# Patient Record
Sex: FEMALE | Race: WHITE | NOT HISPANIC OR LATINO | Employment: OTHER | URBAN - METROPOLITAN AREA
[De-identification: names, ages, dates, MRNs, and addresses within clinical notes are randomized per-mention and may not be internally consistent; named-entity substitution may affect disease eponyms.]

---

## 2021-03-02 ENCOUNTER — HOSPITAL ENCOUNTER (EMERGENCY)
Facility: MEDICAL CENTER | Age: 86
End: 2021-03-02
Attending: EMERGENCY MEDICINE
Payer: MEDICARE

## 2021-03-02 ENCOUNTER — APPOINTMENT (OUTPATIENT)
Dept: RADIOLOGY | Facility: MEDICAL CENTER | Age: 86
End: 2021-03-02
Attending: EMERGENCY MEDICINE
Payer: MEDICARE

## 2021-03-02 VITALS
DIASTOLIC BLOOD PRESSURE: 67 MMHG | SYSTOLIC BLOOD PRESSURE: 150 MMHG | TEMPERATURE: 97.4 F | RESPIRATION RATE: 18 BRPM | HEIGHT: 61 IN | OXYGEN SATURATION: 91 % | WEIGHT: 145 LBS | BODY MASS INDEX: 27.38 KG/M2 | HEART RATE: 70 BPM

## 2021-03-02 DIAGNOSIS — R10.84 GENERALIZED ABDOMINAL PAIN: ICD-10-CM

## 2021-03-02 DIAGNOSIS — D64.9 ANEMIA, UNSPECIFIED TYPE: ICD-10-CM

## 2021-03-02 LAB
ALBUMIN SERPL BCP-MCNC: 3.6 G/DL (ref 3.2–4.9)
ALBUMIN/GLOB SERPL: 1.2 G/DL
ALP SERPL-CCNC: 68 U/L (ref 30–99)
ALT SERPL-CCNC: 14 U/L (ref 2–50)
ANION GAP SERPL CALC-SCNC: 10 MMOL/L (ref 7–16)
ANISOCYTOSIS BLD QL SMEAR: ABNORMAL
APPEARANCE UR: CLEAR
AST SERPL-CCNC: 18 U/L (ref 12–45)
BASOPHILS # BLD AUTO: 1.2 % (ref 0–1.8)
BASOPHILS # BLD: 0.07 K/UL (ref 0–0.12)
BILIRUB SERPL-MCNC: 0.5 MG/DL (ref 0.1–1.5)
BILIRUB UR QL STRIP.AUTO: NEGATIVE
BUN SERPL-MCNC: 16 MG/DL (ref 8–22)
CALCIUM SERPL-MCNC: 8.7 MG/DL (ref 8.5–10.5)
CHLORIDE SERPL-SCNC: 101 MMOL/L (ref 96–112)
CO2 SERPL-SCNC: 21 MMOL/L (ref 20–33)
COLOR UR: YELLOW
COMMENT 1642: NORMAL
CREAT SERPL-MCNC: 0.64 MG/DL (ref 0.5–1.4)
EOSINOPHIL # BLD AUTO: 0.46 K/UL (ref 0–0.51)
EOSINOPHIL NFR BLD: 7.7 % (ref 0–6.9)
ERYTHROCYTE [DISTWIDTH] IN BLOOD BY AUTOMATED COUNT: 47.3 FL (ref 35.9–50)
GLOBULIN SER CALC-MCNC: 2.9 G/DL (ref 1.9–3.5)
GLUCOSE SERPL-MCNC: 92 MG/DL (ref 65–99)
GLUCOSE UR STRIP.AUTO-MCNC: NEGATIVE MG/DL
HCT VFR BLD AUTO: 32.9 % (ref 37–47)
HGB BLD-MCNC: 9.5 G/DL (ref 12–16)
HYPOCHROMIA BLD QL SMEAR: ABNORMAL
IMM GRANULOCYTES # BLD AUTO: 0.02 K/UL (ref 0–0.11)
IMM GRANULOCYTES NFR BLD AUTO: 0.3 % (ref 0–0.9)
KETONES UR STRIP.AUTO-MCNC: NEGATIVE MG/DL
LACTATE BLD-SCNC: 1.2 MMOL/L (ref 0.5–2)
LEUKOCYTE ESTERASE UR QL STRIP.AUTO: NEGATIVE
LIPASE SERPL-CCNC: 38 U/L (ref 11–82)
LYMPHOCYTES # BLD AUTO: 1.51 K/UL (ref 1–4.8)
LYMPHOCYTES NFR BLD: 25.2 % (ref 22–41)
MCH RBC QN AUTO: 22.2 PG (ref 27–33)
MCHC RBC AUTO-ENTMCNC: 28.9 G/DL (ref 33.6–35)
MCV RBC AUTO: 76.9 FL (ref 81.4–97.8)
MICRO URNS: NORMAL
MICROCYTES BLD QL SMEAR: ABNORMAL
MONOCYTES # BLD AUTO: 0.57 K/UL (ref 0–0.85)
MONOCYTES NFR BLD AUTO: 9.5 % (ref 0–13.4)
MORPHOLOGY BLD-IMP: NORMAL
NEUTROPHILS # BLD AUTO: 3.37 K/UL (ref 2–7.15)
NEUTROPHILS NFR BLD: 56.1 % (ref 44–72)
NITRITE UR QL STRIP.AUTO: NEGATIVE
NRBC # BLD AUTO: 0 K/UL
NRBC BLD-RTO: 0 /100 WBC
OVALOCYTES BLD QL SMEAR: NORMAL
PH UR STRIP.AUTO: 7.5 [PH] (ref 5–8)
PLATELET # BLD AUTO: 326 K/UL (ref 164–446)
PLATELET BLD QL SMEAR: NORMAL
PMV BLD AUTO: 10.5 FL (ref 9–12.9)
POIKILOCYTOSIS BLD QL SMEAR: NORMAL
POLYCHROMASIA BLD QL SMEAR: NORMAL
POTASSIUM SERPL-SCNC: 4.2 MMOL/L (ref 3.6–5.5)
PROT SERPL-MCNC: 6.5 G/DL (ref 6–8.2)
PROT UR QL STRIP: NEGATIVE MG/DL
RBC # BLD AUTO: 4.28 M/UL (ref 4.2–5.4)
RBC BLD AUTO: PRESENT
RBC UR QL AUTO: NEGATIVE
SODIUM SERPL-SCNC: 132 MMOL/L (ref 135–145)
SP GR UR STRIP.AUTO: 1.01
UROBILINOGEN UR STRIP.AUTO-MCNC: 0.2 MG/DL
WBC # BLD AUTO: 6 K/UL (ref 4.8–10.8)

## 2021-03-02 PROCEDURE — 83690 ASSAY OF LIPASE: CPT

## 2021-03-02 PROCEDURE — 85025 COMPLETE CBC W/AUTO DIFF WBC: CPT

## 2021-03-02 PROCEDURE — 80053 COMPREHEN METABOLIC PANEL: CPT

## 2021-03-02 PROCEDURE — A9270 NON-COVERED ITEM OR SERVICE: HCPCS | Performed by: EMERGENCY MEDICINE

## 2021-03-02 PROCEDURE — 700117 HCHG RX CONTRAST REV CODE 255: Performed by: EMERGENCY MEDICINE

## 2021-03-02 PROCEDURE — 81003 URINALYSIS AUTO W/O SCOPE: CPT

## 2021-03-02 PROCEDURE — 36415 COLL VENOUS BLD VENIPUNCTURE: CPT

## 2021-03-02 PROCEDURE — 83605 ASSAY OF LACTIC ACID: CPT

## 2021-03-02 PROCEDURE — 74177 CT ABD & PELVIS W/CONTRAST: CPT | Mod: ME

## 2021-03-02 PROCEDURE — 700102 HCHG RX REV CODE 250 W/ 637 OVERRIDE(OP): Performed by: EMERGENCY MEDICINE

## 2021-03-02 PROCEDURE — 99285 EMERGENCY DEPT VISIT HI MDM: CPT

## 2021-03-02 RX ORDER — ACETAMINOPHEN 325 MG/1
650 TABLET ORAL ONCE
Status: COMPLETED | OUTPATIENT
Start: 2021-03-02 | End: 2021-03-02

## 2021-03-02 RX ADMIN — ACETAMINOPHEN 650 MG: 325 TABLET, FILM COATED ORAL at 16:57

## 2021-03-02 RX ADMIN — IOHEXOL 100 ML: 350 INJECTION, SOLUTION INTRAVENOUS at 14:22

## 2021-03-02 NOTE — ED NOTES
Blood drawn and sent to lab. Pt ambulated up to bathroom and provided urine sample. Family to bedside and aware of POC. Call light within reach, will continue to monitor.

## 2021-03-02 NOTE — ED PROVIDER NOTES
ED Provider Note    CHIEF COMPLAINT  Chief Complaint   Patient presents with   • Knee Pain   • Groin Pain       HPI  Aurora Matthews is a 89 y.o. female who presents to the emergency department complaining of pain.  Per EMS report the patient had right lower quadrant abdominal pain right groin pain last night.  When she arrived she was complaining of right knee pain to the nurses here in the ED.  From my evaluation she states she had severe abdominal pain yesterday that is since resolved.  She does not remember where the pain was she does not remember the onset, the exact location and she does not remember if it radiated.  She does not recall when it went away.    Patient has any nausea vomiting or diarrhea.  She denies any urinary symptoms.  Her history is somewhat unreliable but she cannot recall the events.  She does have a history of dementia.    Past history is obtained from chart review.    REVIEW OF SYSTEMS  See HPI for further details. All other systems are negative.    PAST MEDICAL HISTORY  Past Medical History:   Diagnosis Date   • Allergy    • Angina    • CATARACT    • Constipation    • Diverticulosis of colon    • Fall    • Heart attack (HCC) 2006   • Hypertension        FAMILY HISTORY  Family History   Problem Relation Age of Onset   • Heart Disease Father         MI- 32 yo   • Heart Disease Brother         MI       SOCIAL HISTORY  Social History     Socioeconomic History   • Marital status: Single     Spouse name: Not on file   • Number of children: Not on file   • Years of education: Not on file   • Highest education level: Not on file   Occupational History   • Not on file   Tobacco Use   • Smoking status: Never Smoker   • Smokeless tobacco: Never Used   Substance and Sexual Activity   • Alcohol use: Yes     Comment: rare   • Drug use: No   • Sexual activity: Never   Other Topics Concern   • Not on file   Social History Narrative   • Not on file     Social Determinants of Health     Financial Resource  "Strain:    • Difficulty of Paying Living Expenses:    Food Insecurity:    • Worried About Running Out of Food in the Last Year:    • Ran Out of Food in the Last Year:    Transportation Needs:    • Lack of Transportation (Medical):    • Lack of Transportation (Non-Medical):    Physical Activity:    • Days of Exercise per Week:    • Minutes of Exercise per Session:    Stress:    • Feeling of Stress :    Social Connections:    • Frequency of Communication with Friends and Family:    • Frequency of Social Gatherings with Friends and Family:    • Attends Jew Services:    • Active Member of Clubs or Organizations:    • Attends Club or Organization Meetings:    • Marital Status:    Intimate Partner Violence:    • Fear of Current or Ex-Partner:    • Emotionally Abused:    • Physically Abused:    • Sexually Abused:        SURGICAL HISTORY  Past Surgical History:   Procedure Laterality Date   • APPENDECTOMY     • OTHER ABDOMINAL SURGERY      diverticulitis- had partial colectomy   • SINUSCOPY      surgery for sinuses.    • TONSILLECTOMY         CURRENT MEDICATIONS  Home Medications     Reviewed by Mouna Townsend R.N. (Registered Nurse) on 03/02/21 at 1109  Med List Status: Unable to Obtain   Medication Last Dose Status   naproxen (NAPROSYN) 500 MG TABS  Active                ALLERGIES  No Known Allergies    PHYSICAL EXAM  VITAL SIGNS: /69   Pulse 70   Temp 36.3 °C (97.4 °F) (Temporal)   Resp 18   Ht 1.549 m (5' 1\")   Wt 65.8 kg (145 lb)   SpO2 99%   BMI 27.40 kg/m²      Constitutional: Awake alert no acute distress.  HENT: Normocephalic, Atraumatic, Bilateral external ears normal,  Eyes: PERRL, EOMI, Conjunctiva normal, No discharge.   Neck: Normal range of motion  Cardiovascular: Normal heart rate, Normal rhythm, No murmurs, No rubs, No gallops.   Thorax & Lungs: Normal breath sounds, No respiratory distress, No wheezing,  Abdomen: Bowel sounds normal, Soft, No tenderness,  Skin: Warm, Dry, No erythema, " No rash.   Rectal: Performed with RN Mouna as a chaperone, brown guaiac negative stool    Musculoskeletal: Good range of motion in all major joints.  Neurologic: Alert, No focal deficits noted.   Psychiatric: Affect pleasant      Results for orders placed or performed during the hospital encounter of 03/02/21   CBC WITH DIFFERENTIAL   Result Value Ref Range    WBC 6.0 4.8 - 10.8 K/uL    RBC 4.28 4.20 - 5.40 M/uL    Hemoglobin 9.5 (L) 12.0 - 16.0 g/dL    Hematocrit 32.9 (L) 37.0 - 47.0 %    MCV 76.9 (L) 81.4 - 97.8 fL    MCH 22.2 (L) 27.0 - 33.0 pg    MCHC 28.9 (L) 33.6 - 35.0 g/dL    RDW 47.3 35.9 - 50.0 fL    Platelet Count 326 164 - 446 K/uL    MPV 10.5 9.0 - 12.9 fL    Neutrophils-Polys 56.10 44.00 - 72.00 %    Lymphocytes 25.20 22.00 - 41.00 %    Monocytes 9.50 0.00 - 13.40 %    Eosinophils 7.70 (H) 0.00 - 6.90 %    Basophils 1.20 0.00 - 1.80 %    Immature Granulocytes 0.30 0.00 - 0.90 %    Nucleated RBC 0.00 /100 WBC    Neutrophils (Absolute) 3.37 2.00 - 7.15 K/uL    Lymphs (Absolute) 1.51 1.00 - 4.80 K/uL    Monos (Absolute) 0.57 0.00 - 0.85 K/uL    Eos (Absolute) 0.46 0.00 - 0.51 K/uL    Baso (Absolute) 0.07 0.00 - 0.12 K/uL    Immature Granulocytes (abs) 0.02 0.00 - 0.11 K/uL    NRBC (Absolute) 0.00 K/uL    Hypochromia 1+     Anisocytosis 2+ (A)     Microcytosis 2+ (A)    COMP METABOLIC PANEL   Result Value Ref Range    Sodium 132 (L) 135 - 145 mmol/L    Potassium 4.2 3.6 - 5.5 mmol/L    Chloride 101 96 - 112 mmol/L    Co2 21 20 - 33 mmol/L    Anion Gap 10.0 7.0 - 16.0    Glucose 92 65 - 99 mg/dL    Bun 16 8 - 22 mg/dL    Creatinine 0.64 0.50 - 1.40 mg/dL    Calcium 8.7 8.5 - 10.5 mg/dL    AST(SGOT) 18 12 - 45 U/L    ALT(SGPT) 14 2 - 50 U/L    Alkaline Phosphatase 68 30 - 99 U/L    Total Bilirubin 0.5 0.1 - 1.5 mg/dL    Albumin 3.6 3.2 - 4.9 g/dL    Total Protein 6.5 6.0 - 8.2 g/dL    Globulin 2.9 1.9 - 3.5 g/dL    A-G Ratio 1.2 g/dL   LIPASE   Result Value Ref Range    Lipase 38 11 - 82 U/L   LACTIC ACID    Result Value Ref Range    Lactic Acid 1.2 0.5 - 2.0 mmol/L   URINALYSIS CULTURE, IF INDICATED    Specimen: Urine   Result Value Ref Range    Color Yellow     Character Clear     Specific Gravity 1.010 <1.035    Ph 7.5 5.0 - 8.0    Glucose Negative Negative mg/dL    Ketones Negative Negative mg/dL    Protein Negative Negative mg/dL    Bilirubin Negative Negative    Urobilinogen, Urine 0.2 Negative    Nitrite Negative Negative    Leukocyte Esterase Negative Negative    Occult Blood Negative Negative    Micro Urine Req see below    ESTIMATED GFR   Result Value Ref Range    GFR If African American >60 >60 mL/min/1.73 m 2    GFR If Non African American >60 >60 mL/min/1.73 m 2   PERIPHERAL SMEAR REVIEW   Result Value Ref Range    Peripheral Smear Review see below    PLATELET ESTIMATE   Result Value Ref Range    Plt Estimation Normal    MORPHOLOGY   Result Value Ref Range    RBC Morphology Present     Polychromia 1+     Poikilocytosis 1+     Ovalocytes 1+    DIFFERENTIAL COMMENT   Result Value Ref Range    Comments-Diff see below         RADIOLOGY/PROCEDURES  CT-ABDOMEN-PELVIS WITH   Final Result      1.  No acute inflammatory process in the abdomen or pelvis.   2.  Colonic diverticulosis. No diverticulitis.   3.  Small hiatal hernia.   4.  Nonobstructing bilateral nephrolithiasis.   5.  Uterine fibroids.          COURSE & MEDICAL DECISION MAKING  Pertinent Labs & Imaging studies reviewed. (See chart for details)    The patient presents to the emergency department for evaluation of abdominal pain.  The patient is 89 years old really unable to provide me much history.  She states she has had last night but her pain is since resolved.  Does not have any Ativan    Has not had a fever chills and urinary complaint.  No cough or shortness of breath.  She states she cannot tell me where the pain is right is no falls or trauma.  Denies kidney pain.  No urinary complaints.  Exam is benign.      At this point because of her age  and comorbidities elected to do a work-up, CBC CMP and lipase are unremarkable, lactic acid is normal.  Urinalysis does not show signs of infection or blood.    The patient CT scan does not show any pelvic injury, she has no acute abdominal inflammatory change on CT.  She has diverticulosis without diverticulitis and a history of nonobstructing nephrolithiasis.    Patient states she did have a lot of gas last night was given gas pain.  She could have passed a kidney stone.  At this point she has been here in the ED for several hours being observed and assessed and reassessed on several occasions.  She is remained pain-free her abdominal exams remain benign on 3 subsequent examinations.  I talked to her granddaughter who is at bedside she is comfortable her returning to a skilled nursing facility.    The patient will be given oral challenge and Tylenol be discharged back to her skilled nursing facility.    Patient is noted to be anemic, this is new but we do not have any recent labs to compare.  Her last labs in our system are from 2013.  She has a microcytic anemia is likely chronic.  She has a rectal exam that shows brown guaiac negative stool and her BUN is not relatively high related to her creatinine.  I do not think she has an upper GI bleed.  There is no evidence of acute lower GI bleed.    I have made her granddaughter aware of this finding and the fact that she needs follow-up.  She verbalized understanding and states she will comply.    They verbalized understand return precautions and follow-up plans.  The patient is discharged in good condition.    No follow-up provider specified.    FINAL IMPRESSION  1. Generalized abdominal pain     2. Anemia, unspecified type         3.         Electronically signed by: Duarte Rubi M.D., 3/2/2021 11:32 AM

## 2021-03-02 NOTE — ED TRIAGE NOTES
Chief Complaint   Patient presents with   • Knee Pain   • Groin Pain     Pt arrives via EMS from [facility]. Pt c/o right knee and groin pain starting yesterday. Denies any trauma or hx of same.   Pt ARZATE, sensation intact and BLE pale with +pulses.   Chart up for ERP.

## 2021-03-02 NOTE — ED NOTES
Assisted patient with bedpan.   Changed blankets for warm blankets, given patient pillow.  Patient requests water and to go home.  RN aware

## 2021-03-03 DIAGNOSIS — Z23 NEED FOR VACCINATION: ICD-10-CM

## 2021-03-03 NOTE — ED NOTES
RN spoke with grand daughter Sophie.  Grand daughter on way back to ER to take pt back to SNF upon DC.

## 2021-03-03 NOTE — DISCHARGE INSTRUCTIONS
Return to the emergency department if your abdominal pain returns or for any new symptoms or concerns.   Follow-up with your doctor.    You are anemic, you may need more work-up follow-up with your doctor.

## 2022-06-15 ENCOUNTER — HOSPITAL ENCOUNTER (EMERGENCY)
Facility: MEDICAL CENTER | Age: 87
End: 2022-06-15
Attending: EMERGENCY MEDICINE
Payer: OTHER GOVERNMENT

## 2022-06-15 ENCOUNTER — APPOINTMENT (OUTPATIENT)
Dept: RADIOLOGY | Facility: MEDICAL CENTER | Age: 87
End: 2022-06-15
Attending: EMERGENCY MEDICINE
Payer: OTHER GOVERNMENT

## 2022-06-15 VITALS
SYSTOLIC BLOOD PRESSURE: 177 MMHG | WEIGHT: 141 LBS | TEMPERATURE: 97.5 F | HEART RATE: 70 BPM | RESPIRATION RATE: 18 BRPM | DIASTOLIC BLOOD PRESSURE: 73 MMHG | BODY MASS INDEX: 25.95 KG/M2 | HEIGHT: 62 IN | OXYGEN SATURATION: 94 %

## 2022-06-15 DIAGNOSIS — S09.90XA CLOSED HEAD INJURY, INITIAL ENCOUNTER: ICD-10-CM

## 2022-06-15 DIAGNOSIS — S00.03XA SCALP HEMATOMA, INITIAL ENCOUNTER: ICD-10-CM

## 2022-06-15 DIAGNOSIS — S00.01XA ABRASION OF SCALP, INITIAL ENCOUNTER: ICD-10-CM

## 2022-06-15 PROCEDURE — 99284 EMERGENCY DEPT VISIT MOD MDM: CPT | Mod: 25

## 2022-06-15 PROCEDURE — 72125 CT NECK SPINE W/O DYE: CPT | Mod: MF

## 2022-06-15 PROCEDURE — 70450 CT HEAD/BRAIN W/O DYE: CPT | Mod: MG

## 2022-06-15 ASSESSMENT — FIBROSIS 4 INDEX: FIB4 SCORE: 1.46

## 2022-06-15 NOTE — ED PROVIDER NOTES
"ED Provider Note    CHIEF COMPLAINT      HPI  Aurora Matthews is a 89 y.o. female who presents as a TBI activation after ground-level fall at home tonight.  Apparently fell forward out of her wheelchair striking her head.  She does not remember the incident and states that her pain is minimal.  She has no neck pain, chest pain, back pain, or abdominal pain.  She has no pain to her arms or legs.      REVIEW OF SYSTEMS  Constitutional: No recent fevers or chills  Skin: Injury to scalp  HEENT: Injury to scalp.  No facial pain or mandible pain.  Neck: No neck pain or stiffness  Chest: No pain, abrasions, lacerations, or bruising  Pulm: No shortness of breath, pain with deep inspiration or expiration, or hemoptysis  Gastrointestinal: No abdominal pain, nausea, or distention.    Musculoskeletal: No pain, swelling, or focal weakness  Neurologic: Patient denies any numbness or tingling but does not remember the episode  Heme: No bleeding or bruising problems.   Immuno: No hx of recurrent infections      PAST MEDICAL HISTORY   has a past medical history of Allergy, Angina, CATARACT, Constipation, Diverticulosis of colon, Fall, Heart attack (HCC) (2006), and Hypertension.    SOCIAL HISTORY  Social History     Tobacco Use   • Smoking status: Never Smoker   • Smokeless tobacco: Never Used   Substance and Sexual Activity   • Alcohol use: Yes     Comment: rare   • Drug use: No   • Sexual activity: Never       SURGICAL HISTORY   has a past surgical history that includes other abdominal surgery; tonsillectomy; sinuscopy; and appendectomy.    CURRENT MEDICATIONS  Home Medications     Reviewed by Miranda Quezada R.N. (Registered Nurse) on 06/15/22 at 0152  Med List Status: Not Addressed   Medication Last Dose Status   naproxen (NAPROSYN) 500 MG TABS  Active                ALLERGIES  No Known Allergies    PHYSICAL EXAM  VITAL SIGNS: BP (!) 147/85   Pulse 78   Temp 36.4 °C (97.5 °F)   Resp 17   Ht 1.575 m (5' 2\")   Wt 64 kg (141 " lb)   SpO2 96%   BMI 25.79 kg/m²    Gen: Appears tired, otherwise no apparent distress  HEENT: Hematoma with apparent overlying abrasion.  Bleeding controlled however there is matted blood and hair around the area. bilateral external ears normal, Nose normal. Conjunctiva normal, Non-icteric.  Pupils equal bilaterally.  No facial tenderness, mandible tenderness or loose dentition.  Patient able to range mandible without distress.  Neck: Patient in c-collar on arrival.  After c-collar was removed, patient was examined.  No tenderness, Supple, No masses  Lymphatic: No cervical lymphadenopathy noted.   Cardiovascular: Regular rate and rhythm.  Capillary refill less than 3 seconds to all extremities, 2+ distal pulses to all extremities.  Thorax & Lungs: Normal breath sounds, No respiratory distress, No wheezing bilateral chest rise.  No tenderness to palpation or pain with AP/lateral compression of the chest wall.  No subcutaneous emphysema no crepitus, no step-offs, no deformities, no abrasions, no lacerations, no ecchymosis  Abdomen: Bowel sounds normal, Soft, No tenderness, No masses, No pulsatile masses. No Guarding or rebound  Skin: Warm, Dry.  No abrasions, lacerations, or ecchymosis noted  Back: No bony tenderness, No CVA tenderness.  No spinous process tenderness from base of occiput to sacrum.  No step-offs, no deformities, no ecchymosis, abrasions, or lacerations  Extremities: LUE: Passive range of motion of all joints from the shoulder to the fingers appear normal with no distress.  There are no tense muscle compartments, abrasions, ecchymosis, or lacerations noted  RUE: Passive range of motion of all joints from the shoulder to the fingers appear normal with no distress.  There are no tense muscle compartments, abrasions, ecchymosis, or lacerations   LLE:Passive range of motion of all joints from the hip to the toes appears normal with no distress.  There are no tense muscle compartments, abrasions,  ecchymosis, or lacerations noted  RLE:Passive range of motion of all joints from the hip to the toes appears normal with no distress.  There are no tense muscle compartments, abrasions, ecchymosis, or lacerations noted  Neurologic: Appears tired but otherwise alert.  Demonstrates orientation to person and place but disoriented to time and situation.  Moves all extremities and demonstrates 5 out of 5 muscle strength to upper and lower extremities although her range of motion is somewhat limited by her age.  Psychiatric: Affect pleasant.      RADIOLOGY  CT-HEAD W/O   Final Result      1. Cerebral atrophy.   2. White matter lucencies most consistent with small vessel ischemic change versus demyelination or gliosis.   3. No acute intracranial abnormality.   4. Right posterior scalp hematoma.   5. Mild interval growth of a left frontal calcified extra-axial lesion measuring 8 mm that may represent a meningioma.      CT-CSPINE WITHOUT PLUS RECONS   Final Result      1.  No acute traumatic injury of the cervical spine.   2.  Multilevel degenerative changes, most severe at C5-C6.   3.  Bilateral carotid bulb atherosclerotic changes.          COURSE & MEDICAL DECISION MAKING  Patient has for evaluation of what appears to be a low-energy ground-level fall although the exact details are unclear.  Patient's imaging was reassuring although there were a number of incidental findings as noted above.  None of these require emergent expert consultation or further inpatient evaluation.  Likewise, I did not feel laboratory evaluation would benefit the patient or .  Notable that the patient had her wound cleaned by nursing staff and there was no repairable laceration.  There did appear to be a small skin tear/superficial laceration which had stopped bleeding.  Patient will be discharged back to her care facility with local wound care.    FINAL IMPRESSION  1. Closed head injury, initial encounter    2. Scalp hematoma,  initial encounter    3. Abrasion of scalp, initial encounter        Electronically signed by: Gokul Butler M.D., 6/15/2022 1:52 AM

## 2022-06-15 NOTE — ED NOTES
Attempted to call pt's daughter and responsible party Sophie [phone], voicemail was left regarding pt status and need for ride back to [facility]. Social work will be contacted to arrange transportation.

## 2022-06-15 NOTE — ED TRIAGE NOTES
Chief Complaint   Patient presents with   • GLF     DANILO BROWNLEE from an assisted living facility. Per staff, they think pt was wheeling herself in her wheelchair when her flip flop got caught and she fell out of her chair. Pt appears to have a hematoma the right side of her head. Unknown LOC. No thinners. C/o neck and back pain

## 2022-06-15 NOTE — DISCHARGE PLANNING
Medical Social Work    MSW received a call from bedside RN that pt is ready to return to [facility].  MSW [facility]; room [facility].  Keila states that pt will need assistance to return as she's unable to ambulate safely on her own.  MSW faxed PCS and facesheet to [facility] and made follow up phone call to [facility] to arrange transport for 0500.  Bedside RN and Chantal made aware of transport time.